# Patient Record
Sex: FEMALE | Race: WHITE | NOT HISPANIC OR LATINO | Employment: FULL TIME | URBAN - METROPOLITAN AREA
[De-identification: names, ages, dates, MRNs, and addresses within clinical notes are randomized per-mention and may not be internally consistent; named-entity substitution may affect disease eponyms.]

---

## 2019-01-08 DIAGNOSIS — Z01.812 PRE-OPERATIVE LABORATORY EXAMINATION: ICD-10-CM

## 2019-01-08 PROCEDURE — 84703 CHORIONIC GONADOTROPIN ASSAY: CPT

## 2019-01-08 PROCEDURE — 36415 COLL VENOUS BLD VENIPUNCTURE: CPT

## 2019-01-09 LAB — HCG SERPL QL: NEGATIVE

## 2019-01-10 ENCOUNTER — HOSPITAL ENCOUNTER (OUTPATIENT)
Facility: MEDICAL CENTER | Age: 24
End: 2019-01-10
Attending: ORTHOPAEDIC SURGERY | Admitting: ORTHOPAEDIC SURGERY
Payer: COMMERCIAL

## 2019-01-10 VITALS
RESPIRATION RATE: 16 BRPM | HEIGHT: 65 IN | SYSTOLIC BLOOD PRESSURE: 111 MMHG | TEMPERATURE: 97.9 F | WEIGHT: 139.77 LBS | HEART RATE: 117 BPM | DIASTOLIC BLOOD PRESSURE: 68 MMHG | BODY MASS INDEX: 23.29 KG/M2 | OXYGEN SATURATION: 98 %

## 2019-01-10 PROCEDURE — 700111 HCHG RX REV CODE 636 W/ 250 OVERRIDE (IP)

## 2019-01-10 PROCEDURE — 502580 HCHG PACK, KNEE ARTHROSCOPY: Performed by: ORTHOPAEDIC SURGERY

## 2019-01-10 PROCEDURE — 160048 HCHG OR STATISTICAL LEVEL 1-5: Performed by: ORTHOPAEDIC SURGERY

## 2019-01-10 PROCEDURE — 501838 HCHG SUTURE GENERAL: Performed by: ORTHOPAEDIC SURGERY

## 2019-01-10 PROCEDURE — 160041 HCHG SURGERY MINUTES - EA ADDL 1 MIN LEVEL 4: Performed by: ORTHOPAEDIC SURGERY

## 2019-01-10 PROCEDURE — 700102 HCHG RX REV CODE 250 W/ 637 OVERRIDE(OP): Performed by: ANESTHESIOLOGY

## 2019-01-10 PROCEDURE — A6222 GAUZE <=16 IN NO W/SAL W/O B: HCPCS | Performed by: ORTHOPAEDIC SURGERY

## 2019-01-10 PROCEDURE — 160036 HCHG PACU - EA ADDL 30 MINS PHASE I: Performed by: ORTHOPAEDIC SURGERY

## 2019-01-10 PROCEDURE — 160046 HCHG PACU - 1ST 60 MINS PHASE II: Performed by: ORTHOPAEDIC SURGERY

## 2019-01-10 PROCEDURE — A9270 NON-COVERED ITEM OR SERVICE: HCPCS | Performed by: ANESTHESIOLOGY

## 2019-01-10 PROCEDURE — 160029 HCHG SURGERY MINUTES - 1ST 30 MINS LEVEL 4: Performed by: ORTHOPAEDIC SURGERY

## 2019-01-10 PROCEDURE — 160035 HCHG PACU - 1ST 60 MINS PHASE I: Performed by: ORTHOPAEDIC SURGERY

## 2019-01-10 PROCEDURE — 160009 HCHG ANES TIME/MIN: Performed by: ORTHOPAEDIC SURGERY

## 2019-01-10 PROCEDURE — 160002 HCHG RECOVERY MINUTES (STAT): Performed by: ORTHOPAEDIC SURGERY

## 2019-01-10 PROCEDURE — 160025 RECOVERY II MINUTES (STATS): Performed by: ORTHOPAEDIC SURGERY

## 2019-01-10 PROCEDURE — 160047 HCHG PACU  - EA ADDL 30 MINS PHASE II: Performed by: ORTHOPAEDIC SURGERY

## 2019-01-10 RX ORDER — IPRATROPIUM BROMIDE AND ALBUTEROL SULFATE 2.5; .5 MG/3ML; MG/3ML
3 SOLUTION RESPIRATORY (INHALATION)
Status: DISCONTINUED | OUTPATIENT
Start: 2019-01-10 | End: 2019-01-10 | Stop reason: HOSPADM

## 2019-01-10 RX ORDER — HYDROMORPHONE HYDROCHLORIDE 2 MG/ML
0.4 INJECTION, SOLUTION INTRAMUSCULAR; INTRAVENOUS; SUBCUTANEOUS
Status: DISCONTINUED | OUTPATIENT
Start: 2019-01-10 | End: 2019-01-10 | Stop reason: HOSPADM

## 2019-01-10 RX ORDER — HYDROMORPHONE HYDROCHLORIDE 2 MG/ML
0.2 INJECTION, SOLUTION INTRAMUSCULAR; INTRAVENOUS; SUBCUTANEOUS
Status: DISCONTINUED | OUTPATIENT
Start: 2019-01-10 | End: 2019-01-10 | Stop reason: HOSPADM

## 2019-01-10 RX ORDER — MEPERIDINE HYDROCHLORIDE 25 MG/ML
6.25 INJECTION INTRAMUSCULAR; INTRAVENOUS; SUBCUTANEOUS
Status: DISCONTINUED | OUTPATIENT
Start: 2019-01-10 | End: 2019-01-10 | Stop reason: HOSPADM

## 2019-01-10 RX ORDER — ACETAMINOPHEN 500 MG
1000 TABLET ORAL ONCE
Status: COMPLETED | OUTPATIENT
Start: 2019-01-10 | End: 2019-01-10

## 2019-01-10 RX ORDER — DIPHENHYDRAMINE HYDROCHLORIDE 50 MG/ML
12.5 INJECTION INTRAMUSCULAR; INTRAVENOUS
Status: DISCONTINUED | OUTPATIENT
Start: 2019-01-10 | End: 2019-01-10 | Stop reason: HOSPADM

## 2019-01-10 RX ORDER — CELECOXIB 200 MG/1
400 CAPSULE ORAL ONCE
Status: COMPLETED | OUTPATIENT
Start: 2019-01-10 | End: 2019-01-10

## 2019-01-10 RX ORDER — HALOPERIDOL 5 MG/ML
1 INJECTION INTRAMUSCULAR
Status: DISCONTINUED | OUTPATIENT
Start: 2019-01-10 | End: 2019-01-10 | Stop reason: HOSPADM

## 2019-01-10 RX ORDER — BUPIVACAINE HYDROCHLORIDE AND EPINEPHRINE 2.5; 5 MG/ML; UG/ML
INJECTION, SOLUTION EPIDURAL; INFILTRATION; INTRACAUDAL; PERINEURAL
Status: DISCONTINUED | OUTPATIENT
Start: 2019-01-10 | End: 2019-01-10 | Stop reason: HOSPADM

## 2019-01-10 RX ORDER — GABAPENTIN 300 MG/1
300 CAPSULE ORAL ONCE
Status: COMPLETED | OUTPATIENT
Start: 2019-01-10 | End: 2019-01-10

## 2019-01-10 RX ORDER — SODIUM CHLORIDE, SODIUM LACTATE, POTASSIUM CHLORIDE, CALCIUM CHLORIDE 600; 310; 30; 20 MG/100ML; MG/100ML; MG/100ML; MG/100ML
1000 INJECTION, SOLUTION INTRAVENOUS
Status: COMPLETED | OUTPATIENT
Start: 2019-01-10 | End: 2019-01-10

## 2019-01-10 RX ORDER — HYDROMORPHONE HYDROCHLORIDE 2 MG/ML
0.1 INJECTION, SOLUTION INTRAMUSCULAR; INTRAVENOUS; SUBCUTANEOUS
Status: DISCONTINUED | OUTPATIENT
Start: 2019-01-10 | End: 2019-01-10 | Stop reason: HOSPADM

## 2019-01-10 RX ADMIN — CELECOXIB 400 MG: 200 CAPSULE ORAL at 12:24

## 2019-01-10 RX ADMIN — GABAPENTIN 300 MG: 300 CAPSULE ORAL at 12:24

## 2019-01-10 RX ADMIN — ACETAMINOPHEN 1000 MG: 500 TABLET, COATED ORAL at 12:24

## 2019-01-10 RX ADMIN — SODIUM CHLORIDE, SODIUM LACTATE, POTASSIUM CHLORIDE, CALCIUM CHLORIDE 1000 ML: 600; 310; 30; 20 INJECTION, SOLUTION INTRAVENOUS at 12:00

## 2019-01-10 ASSESSMENT — PAIN SCALES - GENERAL
PAINLEVEL_OUTOF10: 1
PAINLEVEL_OUTOF10: 0

## 2019-01-10 NOTE — OR NURSING
Respirations easy. Ace wrap clean and dry to right knee with ice to knee.  Right leg elevated.  Palpable pedal pulses.  Toes warm, pink and mobile with brisk capillary refill.

## 2019-01-10 NOTE — OP REPORT
DATE OF SERVICE:  01/10/2019    PREOPERATIVE DIAGNOSIS:  Right knee pain with mechanical catching after   allograft OATS procedure to the right knee.    POSTOPERATIVE DIAGNOSES:  1.  Symptomatic loose bodies in the right knee, one measuring 8 mm2 and the   other about 8x2 mm.  2.  Full-thickness chondral defect with loose chondral flaps in the   weightbearing area of the lateral tibial plateau measuring about 8 mm2.  3.  Intact allograft OATS procedure to the lateral trochlea.  4.  Grade II chondromalacia of the patella.    PROCEDURES PERFORMED:  1.  Right knee diagnostic arthroscopy with arthroscopic loose body removal.  2.  Right knee arthroscopic chondroplasty of the patella.  3.  Right knee arthroscopic microfracture of the lateral tibial plateau.    SURGEON:  Tanner Pineda MD    ASSISTANT:  Carole Morales PA-C    ANESTHESIA:  General.    ANESTHESIOLOGIST:  Alonso Finch MD    IMPLANTS:  None.    COMPLICATIONS:  None.    DISPOSITION:  Stable to postanesthesia care unit.    INDICATIONS:  The patient is a very pleasant, very active 23-year-old female   who is a college pole vaulter.  She underwent allograft OATS procedure to her   lateral trochlea at an outside facility over a year ago.  She did well from   that and went back to all her previous sports and activities, but she has had   mechanical catching and locking in the knee over the past few months, which   has become progressively more problematic.  The risks, benefits, alternatives,   and limitations of surgical intervention were discussed in detail.  She   expressed understanding and desired to proceed.    DESCRIPTION OF PROCEDURE:  The patient and the correct operative extremity   were identified in the preoperative area.  The knee was marked.  She was   brought to the operating room and the correct operative extremity was again   confirmed.  She was placed supine on the OR table where she underwent general   anesthesia without complication.   Examination under anesthesia showed full   range of motion, no instability, mildly diminished patellar mobility.  The   knee was prepped with alcohol and injected with 30 mL of 1% lidocaine with   epinephrine.  The knee was then prepped and draped in the usual sterile   fashion using ChloraPrep.  Diagnostic arthroscopy was then performed, which   showed a small area of grade II chondromalacia of the patella.  The allograft   OATS on the lateral trochlea looked pristine.  There was a little bit raised   cartilage around the margin where the graft was just slightly depressed from   the superolateral aspect of the trochlea and that was debrided with the   arthroscopic shaver, but the entire allograft OATS looked healthy.  There was   no fraying and the margin around it was perfectly flushed and looked great.  I   went down into the notch where she had an intact ACL and PCL, and in the   notch, she had a large osteocartilaginous loose body, which measured about 8   mm2.  We expanded the medial portal and removed it with an arthroscopic   grasper.  We then checked the medial compartment, which showed intact   cartilage and intact meniscus.  The lateral compartment showed an intact   meniscus, intact popliteus, intact cartilage on the lateral femoral condyle,   but there was full-thickness chondral defect on the lateral tibial plateau   with surrounding grade III loose chondral flaps.  Once those large loose   chondral flaps and loose cartilage was debrided, it left about 8 mm2 chondral   defect in the lateral tibial plateau.  I used a microfracture awl to put   multiple punch holes in the subchondral bone of the tibia down to bleeding   subchondral bone.  I then checked the gutters.  There was an additional small   loose body in the lateral gutter measuring about 2 mm x 6-8 mm.  That was   removed with the arthroscopic shaver.  Chondroplasty was then performed of the   undersurface of the patella with the arthroscopic  shaver from the lateral   portal while viewing medially.  We again checked the gutters, suprapatellar   pouch, all the spaces in the knee, except in the posterior aspect of the knee   for loose bodies, none were identified.  A spinal needle was then placed into   the suprapatellar pouch under direct vision.  The fluid removed from the knee.    The scope was withdrawn.  The portals closed with 3-0 nylon.  The knee was   injected with 0.5% bupivacaine with epinephrine.  Sterile dressings were   applied.  The knee was loosely overwrapped with an Ace wrap.  The patient was   then allowed to awake from anesthesia, transferred to her hospital cart and   taken to the postanesthesia care unit in stable condition.  She tolerated the   procedure well.  There were no immediate complications.       ____________________________________     JULI SAINZ MD RD / RAHUL    DD:  01/10/2019 14:21:41  DT:  01/10/2019 14:35:23    D#:  [id number]  Job#:  [id number]

## 2019-01-10 NOTE — DISCHARGE INSTRUCTIONS
ACTIVITY: Rest and take it easy for the first 24 hours.  A responsible adult is recommended to remain with you during that time.  It is normal to feel sleepy.  We encourage you to not do anything that requires balance, judgment or coordination.    MILD FLU-LIKE SYMPTOMS ARE NORMAL. YOU MAY EXPERIENCE GENERALIZED MUSCLE ACHES, THROAT IRRITATION, HEADACHE AND/OR SOME NAUSEA.    FOR 24 HOURS DO NOT:  Drive, operate machinery or run household appliances.  Drink beer or alcoholic beverages.   Make important decisions or sign legal documents.    SPECIAL INSTRUCTIONS:   May remove dressings Post op Day #2 and Shower with wound uncovered.  Apply bandaids after shower.  Do not soak or submerge incisions for two weeks. Ice and elevate extremity. Follow up 7-10 days. Non Weight Bearing x 4 weeks on crutches    DIET: To avoid nausea, slowly advance diet as tolerated, avoiding spicy or greasy foods for the first day.  Add more substantial food to your diet according to your physician's instructions.  Babies can be fed formula or breast milk as soon as they are hungry.  INCREASE FLUIDS AND FIBER TO AVOID CONSTIPATION.      You should CALL YOUR PHYSICIAN if you develop:  Fever greater than 101 degrees F.  Pain not relieved by medication, or persistent nausea or vomiting.  Excessive bleeding (blood soaking through dressing) or unexpected drainage from the wound.  Extreme redness or swelling around the incision site, drainage of pus or foul smelling drainage.  Inability to urinate or empty your bladder within 8 hours.  Problems with breathing or chest pain.    You should call 911 if you develop problems with breathing or chest pain.  If you are unable to contact your doctor or surgical center, you should go to the nearest emergency room or urgent care center.  Physician's telephone #: [phone]    If any questions arise, call your doctor.  If your doctor is not available, please feel free to call the Surgical Center at  [phone].  The Center is open Monday through Friday from 7AM to 7PM.  You can also call the HEALTH HOTLINE open 24 hours/day, 7 days/week and speak to a nurse at [phone], or toll free at [phone].    A registered nurse may call you a few days after your surgery to see how you are doing after your procedure.    MEDICATIONS: Resume taking daily medication.  Take prescribed pain medication with food.  If no medication is prescribed, you may take non-aspirin pain medication if needed.  PAIN MEDICATION CAN BE VERY CONSTIPATING.  Take a stool softener or laxative such as senokot, pericolace, or milk of magnesia if needed.    Prescription given for **(PATIENT HAS)*.  Last pain medication given at *NONE IN PACU.  If your physician has prescribed pain medication that includes Acetaminophen (Tylenol), do not take additional Acetaminophen (Tylenol) while taking the prescribed medication.    Depression / Suicide Risk    As you are discharged from this [facility] facility, it is important to learn how to keep safe from harming yourself.    Recognize the warning signs:  · Abrupt changes in personality, positive or negative- including increase in energy   · Giving away possessions  · Change in eating patterns- significant weight changes-  positive or negative  · Change in sleeping patterns- unable to sleep or sleeping all the time   · Unwillingness or inability to communicate  · Depression  · Unusual sadness, discouragement and loneliness  · Talk of wanting to die  · Neglect of personal appearance   · Rebelliousness- reckless behavior  · Withdrawal from people/activities they love  · Confusion- inability to concentrate     If you or a loved one observes any of these behaviors or has concerns about self-harm, here's what you can do:  · Talk about it- your feelings and reasons for harming yourself  · Remove any means that you might use to hurt yourself (examples: pills, rope, extension cords, firearm)  · Get  professional help from the community (Mental Health, Substance Abuse, psychological counseling)  · Do not be alone:Call your Safe Contact- someone whom you trust who will be there for you.  · Call your local CRISIS HOTLINE 147-4865 or 909-364-9279  · Call your local Children's Mobile Crisis Response Team [place] (678) 013-0774 or www.[employer]  · Call the toll free National Suicide Prevention Hotlines   · National Suicide Prevention Lifeline 667-423-AUOI (6344)  · National Hope Line Network 800-SUICIDE (255-0498)

## 2019-01-10 NOTE — OR NURSING
1400 Patient to recovery via cart. Placed on monitors. Lma remains in place. Spontaneous respirations.  1418 Report to Marsha Villarreal to assume care of patient.

## 2019-01-11 NOTE — OR NURSING
1520: Pt arrived post R knee scope/chondroplasty, Settled into recliner/dressed, Ace wrap to RLE/ice pack in place/+pedal pulse, Pt has no pain/nausea  1540: Mom in Stg II, DC instructions completed/verbalized understanding, Prescription at home, Cont to have no pain/nausea-drinking juice, Pt has crutches in car/has used them before/feels comfortable with NWB status, RN demonstrated use in Stg II  1613: IV removed, Awaiting MD before dc

## 2019-05-09 ENCOUNTER — TELEPHONE (OUTPATIENT)
Dept: SCHEDULING | Facility: IMAGING CENTER | Age: 24
End: 2019-05-09

## 2019-08-28 ENCOUNTER — OFFICE VISIT (OUTPATIENT)
Dept: MEDICAL GROUP | Facility: MEDICAL CENTER | Age: 24
End: 2019-08-28
Payer: COMMERCIAL

## 2019-08-28 VITALS
SYSTOLIC BLOOD PRESSURE: 114 MMHG | OXYGEN SATURATION: 98 % | TEMPERATURE: 97.2 F | BODY MASS INDEX: 24.49 KG/M2 | HEART RATE: 92 BPM | HEIGHT: 65 IN | DIASTOLIC BLOOD PRESSURE: 78 MMHG | WEIGHT: 147 LBS | RESPIRATION RATE: 14 BRPM

## 2019-08-28 DIAGNOSIS — Z30.9 ENCOUNTER FOR CONTRACEPTIVE MANAGEMENT, UNSPECIFIED TYPE: ICD-10-CM

## 2019-08-28 PROCEDURE — 99202 OFFICE O/P NEW SF 15 MIN: CPT | Performed by: FAMILY MEDICINE

## 2019-08-28 ASSESSMENT — PATIENT HEALTH QUESTIONNAIRE - PHQ9: CLINICAL INTERPRETATION OF PHQ2 SCORE: 0

## 2019-08-28 NOTE — ASSESSMENT & PLAN NOTE
Refill granted  She will return for vaccines and pap   She is not ready today and rushing to class  She did high jump at [facility] when [employer] team won sectionals  Her brother runs in [place]

## 2019-08-28 NOTE — PROGRESS NOTES
This medical record contains text that has been entered with the assistance of computer voice recognition and dictation software.  Therefore, it may contain unintended errors in text, spelling, punctuation, or grammar        Chief Complaint   Patient presents with   • Establish Care     pt needs new pcp        Marina Arline Calderon is a 24 y.o. female here evaluation and management of:    Est care   Request refill of ocp     Current Outpatient Medications   Medication Sig Dispense Refill   • levonorgestrel-ethinyl estradiol (ENPRESSE) per tablet Take 1 Tab by mouth every day for 84 days. 84 Tab 3     No current facility-administered medications for this visit.      Patient Active Problem List    Diagnosis Date Noted   • Encounter for contraceptive management 08/28/2019   • Loose body in knee 04/24/2013   • Closed fracture of medial condyle of humerus 10/09/2012     Past Surgical History:   Procedure Laterality Date   • KNEE ARTHROSCOPY Right 1/10/2019    Procedure: KNEE ARTHROSCOPY;  Surgeon: Tanner Pineda M.D.;  Location: SUR[facility]ERY [facility];  Service: Orthopedics   • CHONDROPLASTY Right 1/10/2019    Procedure: CHONDROPLASTY; MICROFRACTURE; LOOSE BODY REMOVAL;  Surgeon: Tanner Pineda M.D.;  Location: [facility];  Service: Orthopedics   • KNEE ARTHROSCOPY  4/24/2013    Performed by Haleigh Bravo M.D. at SURGERY [facility]   • LOOSE BODY REMOVAL  4/24/2013    Performed by Haleigh Bravo M.D. at SURGERY [facility]   • HARDWARE REMOVAL ORTHO  10/9/2012    Performed by Haleigh Bravo M.D. at SURGERY [facility]   • ELBOW ORIF  4/24/2012    Performed by HALEIGH BRAVO at [facility]      Social History     Tobacco Use   • Smoking status: Never Smoker   • Smokeless tobacco: Never Used   Substance Use Topics   • Alcohol use: Yes     Comment: 1 per week   • Drug use: No     No family history on file.        ROS    all review of system completed and negative  "except for those listed above     Objective:     /78 (BP Location: Right arm, Patient Position: Sitting, BP Cuff Size: Adult)   Pulse 92   Temp 36.2 °C (97.2 °F) (Temporal)   Resp 14   Ht 1.651 m (5' 5\")   Wt 66.7 kg (147 lb)   SpO2 98%  Body mass index is 24.46 kg/m².  Physical Exam:      GEN: comfortable, alert and oriented, well nourished, well developed, in no apparent distress   HEENT: NCAT, eyes: pupils equal and reactive, sclera white, EOMIT, good dentition  HEART: limbs warm and well perfused, regular rate, no JVD, no lower extremity edema  LUNGS: speaking in full sentences, not in apparent respiratory distress, no audible wheezes  MSK: normal tone and bulk, no swelling of the joints, gait steady and normal       Assessment and Plan:   The following treatment plan was discussed        Problem List Items Addressed This Visit     Encounter for contraceptive management     Refill granted  She will return for vaccines and pap   She is not ready today and rushing to class  She did high jump at [facility] when womens team won sectionals  Her brother runs in [place]                Relevant Medications    levonorgestrel-ethinyl estradiol (ENPRESSE) per tablet                Instructed to follow up if symptoms worsen or fail to improve, ER/UC precautions discussed as well    Cheryl Benitez MD  [facility], Family Medicine   [address] [place] [address]   [place] [id number]  Phone: [phone]             "

## 2019-09-23 ENCOUNTER — HOSPITAL ENCOUNTER (OUTPATIENT)
Facility: MEDICAL CENTER | Age: 24
End: 2019-09-23
Attending: FAMILY MEDICINE
Payer: COMMERCIAL

## 2019-09-23 ENCOUNTER — OFFICE VISIT (OUTPATIENT)
Dept: MEDICAL GROUP | Facility: MEDICAL CENTER | Age: 24
End: 2019-09-23
Payer: COMMERCIAL

## 2019-09-23 VITALS
SYSTOLIC BLOOD PRESSURE: 112 MMHG | WEIGHT: 145 LBS | TEMPERATURE: 98.4 F | RESPIRATION RATE: 14 BRPM | DIASTOLIC BLOOD PRESSURE: 78 MMHG | OXYGEN SATURATION: 98 % | BODY MASS INDEX: 24.16 KG/M2 | HEIGHT: 65 IN | HEART RATE: 102 BPM

## 2019-09-23 DIAGNOSIS — Z30.9 ENCOUNTER FOR CONTRACEPTIVE MANAGEMENT, UNSPECIFIED TYPE: ICD-10-CM

## 2019-09-23 DIAGNOSIS — Z12.4 SCREENING FOR CERVICAL CANCER: ICD-10-CM

## 2019-09-23 DIAGNOSIS — M23.40 LOOSE BODY OF KNEE, UNSPECIFIED LATERALITY: ICD-10-CM

## 2019-09-23 DIAGNOSIS — Z01.419 WELL WOMAN EXAM WITH ROUTINE GYNECOLOGICAL EXAM: ICD-10-CM

## 2019-09-23 PROCEDURE — 99395 PREV VISIT EST AGE 18-39: CPT | Performed by: FAMILY MEDICINE

## 2019-09-23 PROCEDURE — 88175 CYTOPATH C/V AUTO FLUID REDO: CPT

## 2019-09-23 PROCEDURE — 99000 SPECIMEN HANDLING OFFICE-LAB: CPT | Performed by: FAMILY MEDICINE

## 2019-09-23 PROCEDURE — 87624 HPV HI-RISK TYP POOLED RSLT: CPT

## 2019-09-23 RX ORDER — LEVONORGESTREL AND ETHINYL ESTRADIOL 0.15-0.03
1 KIT ORAL DAILY
COMMUNITY
End: 2019-09-23 | Stop reason: SDUPTHER

## 2019-09-23 RX ORDER — LEVONORGESTREL AND ETHINYL ESTRADIOL 0.15-0.03
1 KIT ORAL DAILY
Qty: 90 TAB | Refills: 3 | Status: SHIPPED | OUTPATIENT
Start: 2019-09-23 | End: 2020-05-15 | Stop reason: SDUPTHER

## 2019-09-23 NOTE — PROGRESS NOTES
This medical record contains text that has been entered with the assistance of computer voice recognition and dictation software.  Therefore, it may contain unintended errors in text, spelling, punctuation, or grammar        Chief Complaint   Patient presents with   • Gynecologic Exam     pap smear first one        Marina Calderon is a 24 y.o. female here evaluation and management of:    Recently est care  Request refill OCP     Pap smear first one         Current Outpatient Medications   Medication Sig Dispense Refill   • levonorgestrel-ethinyl estradiol (NORDETTE) 0.15-30 MG-MCG per tablet Take 1 Tab by mouth every day. 90 Tab 3     No current facility-administered medications for this visit.      Patient Active Problem List    Diagnosis Date Noted   • Screening for cervical cancer 09/23/2019   • Well woman exam with routine gynecological exam 09/23/2019   • Encounter for contraceptive management 08/28/2019   • Loose body in knee 04/24/2013   • Closed fracture of medial condyle of humerus 10/09/2012     Past Surgical History:   Procedure Laterality Date   • KNEE ARTHROSCOPY Right 1/10/2019    Procedure: KNEE ARTHROSCOPY;  Surgeon: Tanner Pineda M.D.;  Location: [facility];  Service: Orthopedics   • CHONDROPLASTY Right 1/10/2019    Procedure: CHONDROPLASTY; MICROFRACTURE; LOOSE BODY REMOVAL;  Surgeon: Tanner Pineda M.D.;  Location: [facility];  Service: Orthopedics   • KNEE ARTHROSCOPY  4/24/2013    Performed by Carlos Bravo M.D. at SURGERY [facility]   • LOOSE BODY REMOVAL  4/24/2013    Performed by Carlos Bravo M.D. at SUR[facility]ERY [facility]   • HARDWARE REMOVAL ORTHO  10/9/2012    Performed by Carlos Bravo M.D. at SURGERY [facility]   • ELBOW ORIF  4/24/2012    Performed by CARLOS BRAVO at [facility]      Social History     Tobacco Use   • Smoking status: Never Smoker   • Smokeless tobacco: Never Used   Substance Use Topics  "  • Alcohol use: Yes     Comment: 1 per week   • Drug use: No     No family history on file.        ROS  No pelvic pain discharge    all review of system completed and negative except for those listed above     Objective:     /78 (BP Location: Right arm, Patient Position: Sitting, BP Cuff Size: Adult)   Pulse (!) 102   Temp 36.9 °C (98.4 °F) (Temporal)   Resp 14   Ht 1.651 m (5' 5\")   Wt 65.8 kg (145 lb)   SpO2 98%  Body mass index is 24.13 kg/m².  Physical Exam:      GEN: comfortable, alert and oriented, well nourished, well developed, in no apparent distress   HEENT: NCAT, eyes: pupils equal and reactive, sclera white, EOMIT, good dentition  HEART: limbs warm and well perfused, regular rate, no JVD, no lower extremity edema  LUNGS: speaking in full sentences, not in apparent respiratory distress, no audible wheezes  MSK: normal tone and bulk, no swelling of the joints, gait steady and normal     PELVIC:  Normal external female genitalia.  Speculum: Vaginal gilmer wnl no purulent discharge.  Cervix non friable.  Bimanual examination : Uterus size shape and consistency wnl.  No adnexal masses.  No Cervical motion tenderness       Assessment and Plan:   The following treatment plan was discussed        Problem List Items Addressed This Visit     Loose body in knee     Had 3 surgeries recently!         Encounter for contraceptive management     Refill   Risk benefit side effect discussed               Screening for cervical cancer    Relevant Orders    THINPREP PAP, REFLEX HPV ON ASC-US AND ABOVE    Well woman exam with routine gynecological exam     Pap collected today   Prev health care discussed  STI prevention                        Instructed to follow up if symptoms worsen or fail to improve, ER/UC precautions discussed as well    Cheryl Benitez MD  [facility], Family Medicine   [address] [place] [address] [address]ky   [place] [id number]  Phone: [phone]             "

## 2019-09-24 DIAGNOSIS — Z12.4 SCREENING FOR CERVICAL CANCER: ICD-10-CM

## 2019-09-24 LAB — CYTOLOGY REG CYTOL: NORMAL

## 2019-09-26 LAB
HPV HR 12 DNA CVX QL NAA+PROBE: NEGATIVE
HPV16 DNA SPEC QL NAA+PROBE: NEGATIVE
HPV18 DNA SPEC QL NAA+PROBE: NEGATIVE
SPECIMEN SOURCE: NORMAL

## 2019-09-30 ENCOUNTER — TELEPHONE (OUTPATIENT)
Dept: MEDICAL GROUP | Facility: MEDICAL CENTER | Age: 24
End: 2019-09-30

## 2019-09-30 NOTE — TELEPHONE ENCOUNTER
----- Message from Cheryl Benitez M.D. sent at 9/30/2019 12:03 PM PDT -----  This is considered to be a normal (negative) pap in her age group   Follow up 3 years for routine screening

## 2020-05-19 RX ORDER — LEVONORGESTREL AND ETHINYL ESTRADIOL 0.15-0.03
1 KIT ORAL DAILY
Qty: 90 TAB | Refills: 3 | Status: SHIPPED | OUTPATIENT
Start: 2020-05-19 | End: 2021-05-11 | Stop reason: SDUPTHER

## 2020-05-21 ENCOUNTER — TELEPHONE (OUTPATIENT)
Dept: MEDICAL GROUP | Facility: MEDICAL CENTER | Age: 25
End: 2020-05-21

## 2020-05-21 NOTE — TELEPHONE ENCOUNTER
1. Caller Name: Marina Calderon                          Call Back Number: [phone] (home)         How would the patient prefer to be contacted with a response: Phone call do NOT leave a detailed message    LVM asking pt to schedule virtual appt to discuss change of birth control rx

## 2020-05-21 NOTE — TELEPHONE ENCOUNTER
1. Caller Name: [facility]                        Call Back Number: [phone]      How would the patient prefer to be contacted with a response: Phone call do NOT leave a detailed message    pharmacy called stating pt would like the extended cycle birth control (suggested generic seasonique)

## 2021-05-04 RX ORDER — LEVONORGESTREL AND ETHINYL ESTRADIOL 0.15-0.03
1 KIT ORAL DAILY
Qty: 90 TABLET | Refills: 3 | OUTPATIENT
Start: 2021-05-04

## 2021-05-11 ENCOUNTER — TELEMEDICINE (OUTPATIENT)
Dept: MEDICAL GROUP | Facility: MEDICAL CENTER | Age: 26
End: 2021-05-11
Payer: COMMERCIAL

## 2021-05-11 VITALS — HEIGHT: 65 IN | BODY MASS INDEX: 24.16 KG/M2 | WEIGHT: 145 LBS | HEART RATE: 88 BPM | TEMPERATURE: 98 F

## 2021-05-11 DIAGNOSIS — Z00.00 ANNUAL PHYSICAL EXAM: ICD-10-CM

## 2021-05-11 DIAGNOSIS — Z30.9 ENCOUNTER FOR CONTRACEPTIVE MANAGEMENT, UNSPECIFIED TYPE: ICD-10-CM

## 2021-05-11 PROCEDURE — 99395 PREV VISIT EST AGE 18-39: CPT | Mod: 95,CR | Performed by: FAMILY MEDICINE

## 2021-05-11 RX ORDER — LEVONORGESTREL AND ETHINYL ESTRADIOL 0.15-0.03
1 KIT ORAL DAILY
Qty: 90 TABLET | Refills: 3 | Status: SHIPPED | OUTPATIENT
Start: 2021-05-11 | End: 2021-06-03 | Stop reason: SDUPTHER

## 2021-05-11 ASSESSMENT — PATIENT HEALTH QUESTIONNAIRE - PHQ9: CLINICAL INTERPRETATION OF PHQ2 SCORE: 0

## 2021-05-11 NOTE — PROGRESS NOTES
Telemedicine: Established Patient   This evaluation was conducted via Zoom using secure and encrypted videoconferencing technology. The patient was in a private location in the state of [place].    The patient's identity was confirmed and verbal consent was obtained for this virtual visit.    Subjective:   CC:   Chief Complaint   Patient presents with   • Medication Refill     birth control       Marina Calderon is a 26 y.o. 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 90 tablet 3     No current facility-administered medications for this visit.       Patient Active Problem List    Diagnosis Date Noted   • Annual physical exam 05/11/2021   • Screening for cervical cancer 09/23/2019   • Well woman exam with routine gynecological exam 09/23/2019   • Encounter for contraceptive management 08/28/2019   • Loose body in knee 04/24/2013   • Closed fracture of medial condyle of humerus 10/09/2012       No family history on file.    She  has a past medical history of Cold and Pain (01/2019). She also has no past medical history of GERD (gastroesophageal reflux disease), Ulcer, or Urinary tract infection, site not specified.  She  has a past surgical history that includes elbow orif (4/24/2012); hardware removal ortho (10/9/2012); knee arthroscopy (4/24/2013); loose body removal (4/24/2013); knee arthroscopy (Right, 1/10/2019); and chondroplasty (Right,  "1/10/2019).       Objective:   Pulse 88 Comment: pt stated  Temp 36.7 °C (98 °F) (Temporal) Comment: pt stated  Ht 1.651 m (5' 5\") Comment: pt stated  Wt 65.8 kg (145 lb) Comment: pt stated  LMP 03/01/2021   BMI 24.13 kg/m²     Physical Exam       Physical Exam:  Constitutional: Alert, no distress, well-groomed.  Skin: No rashes in visible areas.  Eye: Round. Conjunctiva clear, lids normal. No icterus.   ENMT: Lips pink without lesions, good dentition, moist mucous membranes. Phonation normal.  Neck: No masses, no thyromegaly. Moves freely without pain.  CV: Pulse as reported by patient  Respiratory: Unlabored respiratory effort, no cough or audible wheeze  Psych: Alert and oriented x3, normal affect and mood.       Assessment and Plan:   The following treatment plan was discussed:     1. Encounter for contraceptive management, unspecified type  - levonorgestrel-ethinyl estradiol (NORDETTE) 0.15-30 MG-MCG per tablet; Take 1 tablet by mouth every day.  Dispense: 90 tablet; Refill: 3    2. Annual physical exam    Problem List Items Addressed This Visit     Encounter for contraceptive management     Risk benefit side effect discussed   She would like to continue same form   I offer STI testing she declines   Age appropriate prev health counseling              Relevant Medications    levonorgestrel-ethinyl estradiol (NORDETTE) 0.15-30 MG-MCG per tablet    Annual physical exam          Follow-up: No follow-ups on file.           "

## 2021-05-11 NOTE — ASSESSMENT & PLAN NOTE
Risk benefit side effect discussed   She would like to continue same form   I offer STI testing she declines   Age appropriate prev health counseling

## 2021-06-02 ENCOUNTER — PATIENT MESSAGE (OUTPATIENT)
Dept: MEDICAL GROUP | Facility: MEDICAL CENTER | Age: 26
End: 2021-06-02

## 2021-06-02 DIAGNOSIS — Z30.9 ENCOUNTER FOR CONTRACEPTIVE MANAGEMENT, UNSPECIFIED TYPE: ICD-10-CM

## 2021-06-03 DIAGNOSIS — Z30.9 ENCOUNTER FOR CONTRACEPTIVE MANAGEMENT, UNSPECIFIED TYPE: ICD-10-CM

## 2021-06-03 RX ORDER — LEVONORGESTREL AND ETHINYL ESTRADIOL 0.15-0.03
1 KIT ORAL DAILY
Qty: 90 TABLET | Refills: 3 | Status: SHIPPED | OUTPATIENT
Start: 2021-06-03 | End: 2021-06-04

## 2021-06-03 NOTE — PATIENT COMMUNICATION
Received request via: Patient    Was the patient seen in the last year in this department? Yes    Does the patient have an active prescription (recently filled or refills available) for medication(s) requested? Apparently pharmacy never got script.

## 2021-06-08 RX ORDER — NORETHINDRONE ACETATE AND ETHINYL ESTRADIOL 1.5-30(21)
1 KIT ORAL DAILY
Qty: 91 TABLET | Refills: 0 | Status: SHIPPED | OUTPATIENT
Start: 2021-06-08 | End: 2022-02-03

## 2021-06-10 RX ORDER — LEVONORGESTREL AND ETHINYL ESTRADIOL 0.15-0.03
KIT ORAL
Qty: 91 TABLET | Refills: 4 | Status: SHIPPED | OUTPATIENT
Start: 2021-06-10 | End: 2022-02-03 | Stop reason: SDUPTHER

## 2022-02-03 ENCOUNTER — HOSPITAL ENCOUNTER (OUTPATIENT)
Dept: LAB | Facility: MEDICAL CENTER | Age: 27
End: 2022-02-03
Attending: FAMILY MEDICINE
Payer: COMMERCIAL

## 2022-02-03 ENCOUNTER — OFFICE VISIT (OUTPATIENT)
Dept: MEDICAL GROUP | Facility: MEDICAL CENTER | Age: 27
End: 2022-02-03
Payer: COMMERCIAL

## 2022-02-03 VITALS
BODY MASS INDEX: 25.34 KG/M2 | WEIGHT: 152.12 LBS | DIASTOLIC BLOOD PRESSURE: 82 MMHG | OXYGEN SATURATION: 97 % | HEART RATE: 130 BPM | TEMPERATURE: 98 F | SYSTOLIC BLOOD PRESSURE: 130 MMHG | HEIGHT: 65 IN | RESPIRATION RATE: 16 BRPM

## 2022-02-03 DIAGNOSIS — Z00.00 PREVENTATIVE HEALTH CARE: ICD-10-CM

## 2022-02-03 DIAGNOSIS — Z30.9 ENCOUNTER FOR CONTRACEPTIVE MANAGEMENT, UNSPECIFIED TYPE: ICD-10-CM

## 2022-02-03 DIAGNOSIS — R03.0 ELEVATED BLOOD PRESSURE READING: ICD-10-CM

## 2022-02-03 LAB — HBV SURFACE AB SERPL IA-ACNC: <3.5 MIU/ML (ref 0–10)

## 2022-02-03 PROCEDURE — 36415 COLL VENOUS BLD VENIPUNCTURE: CPT

## 2022-02-03 PROCEDURE — 86706 HEP B SURFACE ANTIBODY: CPT

## 2022-02-03 PROCEDURE — 99395 PREV VISIT EST AGE 18-39: CPT | Performed by: FAMILY MEDICINE

## 2022-02-03 PROCEDURE — 86480 TB TEST CELL IMMUN MEASURE: CPT

## 2022-02-03 RX ORDER — LEVONORGESTREL AND ETHINYL ESTRADIOL 0.15-0.03
1 KIT ORAL
Qty: 91 TABLET | Refills: 4 | Status: SHIPPED | OUTPATIENT
Start: 2022-02-03

## 2022-02-03 NOTE — ASSESSMENT & PLAN NOTE
High compared to prior   Of note because of ocp   Encouraged to obtain blood pressure cuff and check periodically   If elevated persistently I would like her to schedule follow up     Goals discussed

## 2022-02-03 NOTE — ASSESSMENT & PLAN NOTE
Going to school this fall!   Will take some time off prior to travel   Works at [employer] currently     She needs TB screen and proof of hep B vaccine     Age appropriate prev health care discussed   Cancer screening discussed   Vaccines discussed   Labs offered   Blood pressure at goal     Anticipatory guidance including SPF and other skin protective measures, dental hygiene and care, regular exercise, diet, stress and family planning advice discussed.

## 2022-02-03 NOTE — PROGRESS NOTES
This medical record contains text that has been entered with the assistance of computer voice recognition and dictation software.  Therefore, it may contain unintended errors in text, spelling, punctuation, or grammar        Chief Complaint   Patient presents with   • Other     PA school!! Hep B titer (HBsAB) quatitative, TB quantiferon       Marinabelgica Calderon is a 26 y.o. female here evaluation and management of:     Prev health visit   Going to school   Needs tb screen       Current Outpatient Medications   Medication Sig Dispense Refill   • levonorgestrel-ethinyl estradiol (SEASONALE) 0.15-0.03 MG per tablet Take 1 Tablet by mouth every day. 91 Tablet 4     No current facility-administered medications for this visit.     Patient Active Problem List    Diagnosis Date Noted   • Preventative health care 02/03/2022   • Elevated blood pressure reading 02/03/2022   • Annual physical exam 05/11/2021   • Screening for cervical cancer 09/23/2019   • Well woman exam with routine gynecological exam 09/23/2019   • Encounter for contraceptive management 08/28/2019   • Loose body in knee 04/24/2013   • Closed fracture of medial condyle of humerus 10/09/2012     Past Surgical History:   Procedure Laterality Date   • KNEE ARTHROSCOPY Right 1/10/2019    Procedure: KNEE ARTHROSCOPY;  Surgeon: Tanner Pineda M.D.;  Location: [facility];  Service: Orthopedics   • CHONDROPLASTY Right 1/10/2019    Procedure: CHONDROPLASTY; MICROFRACTURE; LOOSE BODY REMOVAL;  Surgeon: Tanner Pineda M.D.;  Location: [facility];  Service: Orthopedics   • KNEE ARTHROSCOPY  4/24/2013    Performed by Haleigh Bravo M.D. at [facility]   • LOOSE BODY REMOVAL  4/24/2013    Performed by Haleigh Bravo M.D. at [facility]   • HARDWARE REMOVAL ORTHO  10/9/2012    Performed by Haleigh Bravo M.D. at [facility]   • ORIF, ELBOW  4/24/2012    Performed by HALEIGH BRAVO at  [facility]      Social History     Tobacco Use   • Smoking status: Never Smoker   • Smokeless tobacco: Never Used   Vaping Use   • Vaping Use: Never used   Substance Use Topics   • Alcohol use: Yes     Comment: 1 per week   • Drug use: No     No family history on file.        ROS    all review of system completed and negative except for those listed above     Objective:     /82 (BP Location: Left arm, Patient Position: Sitting, BP Cuff Size: Adult)   Pulse (!) 130   Temp 36.7 °C (98 °F) (Temporal)   Resp 16   Ht 1.651 m (5' 5\")   Wt 69 kg (152 lb 1.9 oz)   SpO2 97%  Body mass index is 25.31 kg/m².  Physical Exam:        GEN: comfortable, alert and oriented, well nourished, well developed, in no apparent distress   HEENT: NCAT, eyes: pupils equal and reactive, sclera white, EOMIT, good dentition  HEART: limbs warm and well perfused, regular rate, no JVD, no lower extremity edema  LUNGS: speaking in full sentences, not in apparent respiratory distress, no audible wheezes  MSK: normal tone and bulk, no swelling of the joints, gait steady and normal         Assessment and Plan:   The following treatment plan was discussed        Problem List Items Addressed This Visit     Encounter for contraceptive management     Refill today              Preventative health care     Going to school this fall!   Will take some time off prior to travel   Works at [employer] currently     She needs TB screen and proof of hep B vaccine     Age appropriate prev health care discussed   Cancer screening discussed   Vaccines discussed   Labs offered   Blood pressure at goal     Anticipatory guidance including SPF and other skin protective measures, dental hygiene and care, regular exercise, diet, stress and family planning advice discussed.             Relevant Medications    levonorgestrel-ethinyl estradiol (SEASONALE) 0.15-0.03 MG per tablet    Other Relevant Orders    Quantiferon Gold TB (PPD)    HEP B SURFACE AB    " Elevated blood pressure reading     High compared to prior   Of note because of ocp   Encouraged to obtain blood pressure cuff and check periodically   If elevated persistently I would like her to schedule follow up     Goals discussed                          Instructed to follow up if symptoms worsen or fail to improve, ER/UC precautions discussed as well    Cheryl Benitez MD  [facility], [address]   [place] [id number]  Phone: [phone]

## 2022-02-04 LAB
GAMMA INTERFERON BACKGROUND BLD IA-ACNC: 0.05 IU/ML
M TB IFN-G BLD-IMP: NEGATIVE
M TB IFN-G CD4+ BCKGRND COR BLD-ACNC: -0.01 IU/ML
MITOGEN IGNF BCKGRD COR BLD-ACNC: >10 IU/ML
QFT TB2 - NIL TBQ2: 0 IU/ML

## 2022-10-31 ENCOUNTER — HOSPITAL ENCOUNTER (OUTPATIENT)
Dept: LAB | Facility: MEDICAL CENTER | Age: 27
End: 2022-10-31
Attending: PHYSICIAN ASSISTANT
Payer: COMMERCIAL

## 2022-10-31 PROCEDURE — 36415 COLL VENOUS BLD VENIPUNCTURE: CPT

## 2022-10-31 PROCEDURE — 86706 HEP B SURFACE ANTIBODY: CPT

## 2022-11-01 LAB — HBV SURFACE AB SERPL IA-ACNC: ABNORMAL MIU/ML (ref 0–10)

## 2023-01-13 ENCOUNTER — HOSPITAL ENCOUNTER (OUTPATIENT)
Dept: LAB | Facility: MEDICAL CENTER | Age: 28
End: 2023-01-13
Attending: PHYSICIAN ASSISTANT
Payer: COMMERCIAL

## 2023-01-13 DIAGNOSIS — Z11.1 SCREENING-PULMONARY TB: ICD-10-CM

## 2023-01-13 PROCEDURE — 86480 TB TEST CELL IMMUN MEASURE: CPT

## 2023-01-13 PROCEDURE — 36415 COLL VENOUS BLD VENIPUNCTURE: CPT

## 2023-01-16 LAB
GAMMA INTERFERON BACKGROUND BLD IA-ACNC: 0.05 IU/ML
M TB IFN-G BLD-IMP: NEGATIVE
M TB IFN-G CD4+ BCKGRND COR BLD-ACNC: -0.01 IU/ML
MITOGEN IGNF BCKGRD COR BLD-ACNC: >10 IU/ML
QFT TB2 - NIL TBQ2: -0.01 IU/ML

## 2023-07-24 ENCOUNTER — NON-PROVIDER VISIT (OUTPATIENT)
Dept: OCCUPATIONAL MEDICINE | Facility: CLINIC | Age: 28
End: 2023-07-24

## 2023-07-24 DIAGNOSIS — Z02.89 ENCOUNTER FOR OCCUPATIONAL HEALTH EXAMINATION INVOLVING RESPIRATOR: ICD-10-CM

## 2023-07-24 PROCEDURE — 94375 RESPIRATORY FLOW VOLUME LOOP: CPT | Performed by: NURSE PRACTITIONER

## (undated) DEVICE — SUTURE GENERAL

## (undated) DEVICE — KIT ANESTHESIA W/CIRCUIT & 3/LT BAG W/FILTER (20EA/CA)

## (undated) DEVICE — HUMID-VENT HEAT AND MOISTURE EXCHANGE- (50/BX)

## (undated) DEVICE — SUCTION INSTRUMENT YANKAUER BULBOUS TIP W/O VENT (50EA/CA)

## (undated) DEVICE — SPONGE GAUZESTER 4 X 4 4PLY - (128PK/CA)

## (undated) DEVICE — ELECTRODE 850 FOAM ADHESIVE - HYDROGEL RADIOTRNSPRNT (50/PK)

## (undated) DEVICE — CHLORAPREP 26 ML APPLICATOR - ORANGE TINT(25/CA)

## (undated) DEVICE — SUTURE 3-0 ETHILON FS-1 - (36/BX) 30 INCH

## (undated) DEVICE — BANDAGE ELASTIC STERILE VELCRO 6 X 5 YDS (25EA/CA)

## (undated) DEVICE — GLOVE BIOGEL INDICATOR SZ 7.5 SURGICAL PF LTX - (50PR/BX 4BX/CA)

## (undated) DEVICE — LACTATED RINGERS INJ 1000 ML - (14EA/CA 60CA/PF)

## (undated) DEVICE — TUBING PUMP WITH CONNECTOR REDEUCE (1EA)

## (undated) DEVICE — GLOVE BIOGEL PI ORTHO SZ 7.5 PF LF (40PR/BX)

## (undated) DEVICE — CANISTER SUCTION RIGID RED 1500CC (40EA/CA)

## (undated) DEVICE — GLOVE BIOGEL SZ 7 SURGICAL PF LTX - (50PR/BX 4BX/CA)

## (undated) DEVICE — NEEDLE SAFETY 18 GA X 1 1/2 IN (100EA/BX)

## (undated) DEVICE — PROTECTOR ULNA NERVE - (36PR/CA)

## (undated) DEVICE — SENSOR SPO2 NEO LNCS ADHESIVE (20/BX) SEE USER NOTES

## (undated) DEVICE — NEPTUNE 4 PORT MANIFOLD - (20/PK)

## (undated) DEVICE — TUBING PATIENT W/CONNECTOR REDEUCE (1EA)

## (undated) DEVICE — BLADE SHAVER AGGRESSIVE PLUS 4.0MM ANGLED (5EA/BX)

## (undated) DEVICE — SODIUM CHL IRRIGATION 0.9% 1000ML (12EA/CA)

## (undated) DEVICE — MASK ANESTHESIA ADULT  - (100/CA)

## (undated) DEVICE — DRAPE LARGE 3 QUARTER - (20/CA)

## (undated) DEVICE — PACK KNEE ARTHROSCOPY SM OR - (2EA/CA)

## (undated) DEVICE — GLOVE BIOGEL INDICATOR SZ 8 SURGICAL PF LTX - (50/BX 4BX/CA)

## (undated) DEVICE — BAG, SPONGE COUNT 50600

## (undated) DEVICE — WATER IRRIGATION STERILE 1000ML (12EA/CA)

## (undated) DEVICE — TUBE CONNECTING SUCTION - CLEAR PLASTIC STERILE 72 IN (50EA/CA)

## (undated) DEVICE — HEAD HOLDER JUNIOR/ADULT

## (undated) DEVICE — MASK AIRWAY SIZE 3 UNIQUE SILICON (10/BX)

## (undated) DEVICE — GLOVE BIOGEL SZ 6.5 SURGICAL PF LTX (50PR/BX 4BX/CA)

## (undated) DEVICE — DRESSING XEROFORM 1X8 - (50/BX 4BX/CA)

## (undated) DEVICE — SODIUM CHL. IRRIGATION 0.9% 3000ML (4EA/CA 65CA/PF)

## (undated) DEVICE — GLOVE SZ 7.5 LF PROTEXIS (50PR/BX)

## (undated) DEVICE — GLOVE BIOGEL PI INDICATOR SZ 6.5 SURGICAL PF LF - (50/BX 4BX/CA)

## (undated) DEVICE — DRAPE LOWER EXTREMETY - (6/CA)

## (undated) DEVICE — DRAPE U SPLIT IMP 54 X 76 - (24/CA)

## (undated) DEVICE — GOWN WARMING STANDARD FLEX - (30/CA)

## (undated) DEVICE — KIT ROOM DECONTAMINATION

## (undated) DEVICE — SYRINGE 30 ML LL (56/BX)

## (undated) DEVICE — GLOVE, LITE (PAIR)

## (undated) DEVICE — ELECTRODE DUAL RETURN W/ CORD - (50/PK)